# Patient Record
Sex: MALE | Race: WHITE
[De-identification: names, ages, dates, MRNs, and addresses within clinical notes are randomized per-mention and may not be internally consistent; named-entity substitution may affect disease eponyms.]

---

## 2022-01-08 ENCOUNTER — TRANSCRIPTION ENCOUNTER (OUTPATIENT)
Age: 67
End: 2022-01-08

## 2022-08-01 ENCOUNTER — NON-APPOINTMENT (OUTPATIENT)
Age: 67
End: 2022-08-01

## 2022-11-13 ENCOUNTER — NON-APPOINTMENT (OUTPATIENT)
Age: 67
End: 2022-11-13

## 2023-01-31 ENCOUNTER — APPOINTMENT (OUTPATIENT)
Dept: PODIATRY | Facility: CLINIC | Age: 68
End: 2023-01-31
Payer: MEDICARE

## 2023-01-31 DIAGNOSIS — M20.5X2 OTHER DEFORMITIES OF TOE(S) (ACQUIRED), LEFT FOOT: ICD-10-CM

## 2023-01-31 DIAGNOSIS — M10.9 GOUT, UNSPECIFIED: ICD-10-CM

## 2023-01-31 PROCEDURE — 99203 OFFICE O/P NEW LOW 30 MIN: CPT

## 2023-02-08 PROBLEM — M20.5X2 HALLUX LIMITUS OF LEFT FOOT: Status: ACTIVE | Noted: 2023-02-03

## 2023-02-08 PROBLEM — M10.9 GOUT: Status: ACTIVE | Noted: 2023-02-03

## 2023-02-08 NOTE — HISTORY OF PRESENT ILLNESS
[Sneakers] : adrien [FreeTextEntry1] : Patient presents today for evaluation of pain that persists at his left foot. On 11/14/22 patient went to [facility], where they got x-rays and he was diagnosed with gout and treated with Indomethacin which worked for the most part. It stated to flare-up again recently. There is no recent trauma. He noticed swelling compared to the other foot.\par

## 2023-02-08 NOTE — PROCEDURE
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

## 2023-02-08 NOTE — PHYSICAL EXAM
[2+] : left foot dorsalis pedis 2+ [Sensation] : the sensory exam was normal to light touch and pinprick [No Focal Deficits] : no focal deficits [Deep Tendon Reflexes (DTR)] : deep tendon reflexes were 2+ and symmetric [Motor Exam] : the motor exam was normal [Delayed in the Right Toes] : capillary refills normal in right toes [Delayed in the Left Toes] : capillary refills normal in the left toes [FreeTextEntry3] : Hair growth noted on digits. Proximal to distal cooling is within normal limits.  [de-identified] : There is hallux limitus especially when compared to the contralateral side. There is no acute gout now and it looks like he just has some hallux limitus that is still flared up. I believe that when he did seek medicine it was gout, but at this point it is just either a hallux limitus or gouty arthritis that continues to flare and be painful.  [FreeTextEntry1] : Swelling about the left 1st MPJ.

## 2023-02-08 NOTE — ASSESSMENT
[FreeTextEntry1] : \par Impression: Gout (M10.9). Hallux limitus (M20.5X2).\par \par Treatment:  I want the patient to get good stability sneakers which I think are very important towards helping this problem. Discussed Jeremy's extension. He has Indocin that he takes once in a while if it flares up. He could ice it. I think it is pretty stable. I explained that it is arthritis and now this is not gout that is flaring up. I discussed with the patient continuing his stability shoes, possibility of an injection with any flare-up. I gave him a tube foam toe splint to use for the next 2 weeks. Discussed 1st MPJ fusion as well.

## 2024-05-17 ENCOUNTER — NON-APPOINTMENT (OUTPATIENT)
Age: 69
End: 2024-05-17

## 2024-08-24 ENCOUNTER — NON-APPOINTMENT (OUTPATIENT)
Age: 69
End: 2024-08-24

## 2024-12-04 ENCOUNTER — NON-APPOINTMENT (OUTPATIENT)
Age: 69
End: 2024-12-04